# Patient Record
Sex: MALE | URBAN - METROPOLITAN AREA
[De-identification: names, ages, dates, MRNs, and addresses within clinical notes are randomized per-mention and may not be internally consistent; named-entity substitution may affect disease eponyms.]

---

## 2021-04-05 ENCOUNTER — OFFICE VISIT (OUTPATIENT)
Dept: URBAN - METROPOLITAN AREA CLINIC 86 | Facility: CLINIC | Age: 81
End: 2021-04-05
Payer: MEDICARE

## 2021-04-05 DIAGNOSIS — H31.012 MACULA SCAR OF POSTERIOR POLE OF LEFT EYE: Primary | ICD-10-CM

## 2021-04-05 DIAGNOSIS — H25.13 AGE-RELATED NUCLEAR CATARACT, BILATERAL: ICD-10-CM

## 2021-04-05 PROCEDURE — 99204 OFFICE O/P NEW MOD 45 MIN: CPT | Performed by: OPHTHALMOLOGY

## 2021-04-05 PROCEDURE — 92134 CPTRZ OPH DX IMG PST SGM RTA: CPT | Performed by: OPHTHALMOLOGY

## 2021-04-05 ASSESSMENT — INTRAOCULAR PRESSURE
OD: 21
OS: 24

## 2021-04-05 NOTE — IMPRESSION/PLAN
Impression: Age-related nuclear cataract, bilateral: H25.13. Plan: Cleared for cataract surgery from retina perspective.

## 2021-04-05 NOTE — IMPRESSION/PLAN
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
-history of trauma as child
-long standing poor vision
-increased CME OS likely due to overlying ERM/traction
-no evidence of CNVM seen on exam

OCT:
OD: WNL
OS: scarring; IRF; ERM Plan: At this time, I would recommend observation with close monitoring. If any progressive changes would consider Avastin injection OS. 

RTC 3-4 months DFE OU OCT OU